# Patient Record
Sex: MALE | Race: WHITE | NOT HISPANIC OR LATINO | Employment: FULL TIME | URBAN - METROPOLITAN AREA
[De-identification: names, ages, dates, MRNs, and addresses within clinical notes are randomized per-mention and may not be internally consistent; named-entity substitution may affect disease eponyms.]

---

## 2020-03-03 ENCOUNTER — OFFICE VISIT - HEALTHEAST (OUTPATIENT)
Dept: FAMILY MEDICINE | Facility: CLINIC | Age: 25
End: 2020-03-03

## 2020-03-03 DIAGNOSIS — F10.10 ALCOHOL CONSUMPTION BINGE DRINKING: ICD-10-CM

## 2020-03-03 DIAGNOSIS — Z00.00 WELL ADULT EXAM: ICD-10-CM

## 2020-03-03 DIAGNOSIS — E66.3 OVERWEIGHT (BMI 25.0-29.9): ICD-10-CM

## 2020-03-03 DIAGNOSIS — Z13.220 SCREENING FOR HYPERLIPIDEMIA: ICD-10-CM

## 2020-03-03 DIAGNOSIS — Z13.1 SCREENING FOR DIABETES MELLITUS: ICD-10-CM

## 2020-03-03 LAB
CHOLEST SERPL-MCNC: 193 MG/DL
FASTING STATUS PATIENT QL REPORTED: YES
FASTING STATUS PATIENT QL REPORTED: YES
GLUCOSE BLD-MCNC: 97 MG/DL (ref 70–99)
HDLC SERPL-MCNC: 56 MG/DL
LDLC SERPL CALC-MCNC: 118 MG/DL
TRIGL SERPL-MCNC: 97 MG/DL

## 2020-03-03 ASSESSMENT — MIFFLIN-ST. JEOR: SCORE: 1910

## 2020-03-04 ENCOUNTER — COMMUNICATION - HEALTHEAST (OUTPATIENT)
Dept: FAMILY MEDICINE | Facility: CLINIC | Age: 25
End: 2020-03-04

## 2021-06-04 VITALS
DIASTOLIC BLOOD PRESSURE: 72 MMHG | SYSTOLIC BLOOD PRESSURE: 108 MMHG | HEART RATE: 48 BPM | RESPIRATION RATE: 17 BRPM | WEIGHT: 205.4 LBS | BODY MASS INDEX: 29.41 KG/M2 | HEIGHT: 70 IN

## 2021-06-06 NOTE — PROGRESS NOTES
Assessment/ Plan:      1. Well adult exam     2. Alcohol consumption binge drinking     3. Screening for hyperlipidemia  Lipid [place] FASTING   4. Screening for diabetes mellitus  Glucose   5. Overweight (BMI 25.0-29.9)       Healthy Male exam completed today.  I discussed preventative measures with the patient including continuing with lifestyle modifications of a balanced diet and regular cardiovascular exercise.  I discussed self testicular exams and how to complete these. Encouraged reduction of ETOH use and limiting use in general. Reviewed risks of ETOH use and impact on overall health.  I encouraged patient to follow-up yearly for annual physicals and sooner as needed.      Subjective:      Joe De Los Santos is a 24 y.o. male who presents for an annual exam. The patient reports that there is not domestic violence in his life. Strong family history of cardiac of CHF, hyperlipidemia, and hypertension. Dad is currently working with cardiology. He denies any other questions or concerns.      Healthy Habits:   Regular Exercise: Yes  Sunscreen Use: Yes  Healthy Diet: No  Dental Visits Regularly: Yes  Seat Belt: Yes  Sexually active: Yes  Monthly Self Testicular Exams:  No  Hemoccults: No  Flex Sig: No  Colonoscopy: No  Lipid Profile: Yes  Glucose Screen: Yes  Prevention of Osteoporosis: N/A  Last Dexa: N/A  Guns at Home:  Yes  Guns Safety Locks:  Yes      Immunization History   Administered Date(s) Administered     Dtap 1995, 1995, 1995, 10/25/1996, 07/20/2000     HPV Quadrivalent 06/20/2012, 10/05/2012, 09/03/2014     Hep A, Adult IM (19yr & older) 12/07/2017     Hep B, Peds or Adolescent 07/20/2000, 11/08/2000, 04/12/2001     Hib (PRP-T) 1995, 1995, 1995, 10/25/1996     IPV 1995, 10/25/1996, 07/20/2000     MMR 07/19/1996, 07/23/2007     Meningococcal MCV4P 06/11/2010, 09/03/2014     Tdap 12/07/2017     Varicella 11/07/2007, 09/24/2008     Immunization status: up to date and  documented.    No exam data present    No current outpatient medications on file.     No current facility-administered medications for this visit.      History reviewed. No pertinent past medical history.  History reviewed. No pertinent surgical history.  Patient has no known allergies.  Family History   Problem Relation Age of Onset     No Medical Problems Mother      Heart disease Father      Hyperlipidemia Father      Hypertension Father      Cancer Paternal Grandmother         ovarian cancer     Heart disease Paternal Grandfather      Social History     Socioeconomic History     Marital status: Single     Spouse name: Not on file     Number of children: Not on file     Years of education: Not on file     Highest education level: Not on file   Occupational History     Not on file   Social Needs     Financial resource strain: Not on file     Food insecurity:     Worry: Not on file     Inability: Not on file     Transportation needs:     Medical: Not on file     Non-medical: Not on file   Tobacco Use     Smoking status: Light Tobacco Smoker     Types: Cigarettes     Smokeless tobacco: Current User     Tobacco comment: smoking rare with drinking on a weekend   Substance and Sexual Activity     Alcohol use: Yes     Alcohol/week: 30.0 standard drinks     Types: 24 Cans of beer, 6 Shots of liquor per week     Frequency: 4 or more times a week     Drinks per session: 1 or 2     Binge frequency: Weekly     Drug use: Not Currently     Sexual activity: Yes     Partners: Female     Birth control/protection: Condom   Lifestyle     Physical activity:     Days per week: Not on file     Minutes per session: Not on file     Stress: Not on file   Relationships     Social connections:     Talks on phone: Not on file     Gets together: Not on file     Attends Christian service: Not on file     Active member of club or organization: Not on file     Attends meetings of clubs or organizations: Not on file     Relationship status: Not  "on file     Intimate partner violence:     Fear of current or ex partner: Not on file     Emotionally abused: Not on file     Physically abused: Not on file     Forced sexual activity: Not on file   Other Topics Concern     Not on file   Social History Narrative     Not on file       Review of Systems  General:  Denies problem  Eyes: Denies problem  Ears/Nose/Throat: Denies problem  Cardiovascular: Denies problem  Respiratory:  Denies problem  Gastrointestinal:  Denies problem  Genitourinary: Denies problem  Musculoskeletal:  Denies problem except for left shoulder pain. Following with orthopedics for this.   Skin: Denies problem  Neurologic: Denies problem  Psychiatric: Denies problem  Endocrine: Denies problem  Heme/Lymphatic: Denies problem   Allergic/Immunologic: Denies problem        Objective:     Vitals:    03/03/20 1103   BP: 108/72   Pulse: (!) 48   Resp: 17   Weight: 205 lb 6.4 oz (93.2 kg)   Height: 5' 9.5\" (1.765 m)     Body mass index is 29.9 kg/m .    Physical  General Appearance: Alert, cooperative, no distress, appears stated age  Head: Normocephalic, without obvious abnormality, atraumatic  Eyes: PERRL, conjunctiva/corneas clear, EOM's intact  Ears: Normal TM's and external ear canals, both ears  Nose: Nares normal, septum midline,mucosa normal, no drainage  Throat: Lips, mucosa, and tongue normal; teeth and gums normal  Neck: Supple, symmetrical, trachea midline, no adenopathy;  thyroid: not enlarged, symmetric, no tenderness/mass/nodules; no carotid bruit or JVD  Back: Symmetric, no curvature, ROM normal, no CVA tenderness  Lungs: Clear to auscultation bilaterally, respirations unlabored  Heart: Regular rate and rhythm, S1 and S2 normal, no murmur, rub, or gallop,  Abdomen: Soft, non-tender, bowel sounds active all four quadrants,  no masses, no organomegaly  Genitourinary: Penis normal. Right testis is descended. Left testis is descended.   Musculoskeletal: Normal range of motion. No joint " swelling or deformity.   Extremities: Extremities normal, atraumatic, no cyanosis or edema  Skin: Skin color, texture, turgor normal, no rashes or lesions  Lymph nodes: Cervical, supraclavicular, and axillary nodes normal  Neurologic: He is alert. He has normal reflexes.   Psychiatric: He has a normal mood and affect.

## 2021-06-16 PROBLEM — E66.3 OVERWEIGHT: Status: ACTIVE | Noted: 2020-03-03

## 2021-06-20 NOTE — LETTER
Letter by Maria Del Carmen Dunbar CNP at      Author: Maria Del Carmen Dunbar CNP Service: -- Author Type: --    Filed:  Encounter Date: 3/4/2020 Status: (Other)         Joe HANNAH De Los Santos  [address] [place] [address]             March 4, 2020         Dear Mr. De Los Santos,    Below are the results from your recent visit:    Resulted Orders   Lipid [place] FASTING   Result Value Ref Range    Cholesterol 193 <=199 mg/dL    Triglycerides 97 <=149 mg/dL    HDL Cholesterol 56 >=40 mg/dL    LDL Calculated 118 <=129 mg/dL    Patient Fasting > 8hrs? Yes    Glucose   Result Value Ref Range    Glucose 97 70 - 99 mg/dL    Patient Fasting > 8hrs? Yes     Narrative    Fasting Glucose reference range is 70-99 mg/dL per  American Diabetes Association (ADA) guidelines.       Your cholesterol is curly along with the rest of the results. As we discussed during your office visit goal is to remain healthy and implementing habits now that will remain with you. It was nice to meet you. Let me know if you have any questions.     Please call with questions or contact us using [employer].    Sincerely,        Electronically signed by Maria Del Carmen Dunbar CNP

## 2024-01-09 ENCOUNTER — OFFICE VISIT (OUTPATIENT)
Dept: FAMILY MEDICINE | Facility: CLINIC | Age: 29
End: 2024-01-09
Payer: COMMERCIAL

## 2024-01-09 VITALS
SYSTOLIC BLOOD PRESSURE: 106 MMHG | BODY MASS INDEX: 28.92 KG/M2 | HEIGHT: 70 IN | TEMPERATURE: 98.1 F | DIASTOLIC BLOOD PRESSURE: 73 MMHG | RESPIRATION RATE: 16 BRPM | HEART RATE: 62 BPM | WEIGHT: 202 LBS | OXYGEN SATURATION: 98 %

## 2024-01-09 DIAGNOSIS — Z11.59 NEED FOR HEPATITIS C SCREENING TEST: ICD-10-CM

## 2024-01-09 DIAGNOSIS — F10.29 ALCOHOL DEPENDENCE WITH UNSPECIFIED ALCOHOL-INDUCED DISORDER (H): ICD-10-CM

## 2024-01-09 DIAGNOSIS — Z23 NEED FOR VACCINATION: ICD-10-CM

## 2024-01-09 DIAGNOSIS — Z11.3 SCREEN FOR STD (SEXUALLY TRANSMITTED DISEASE): ICD-10-CM

## 2024-01-09 DIAGNOSIS — Z11.4 SCREENING FOR HIV (HUMAN IMMUNODEFICIENCY VIRUS): ICD-10-CM

## 2024-01-09 DIAGNOSIS — Z00.00 ENCOUNTER FOR ANNUAL PHYSICAL EXAM: Primary | ICD-10-CM

## 2024-01-09 DIAGNOSIS — Z82.49 FAMILY HISTORY OF HEART DISEASE: ICD-10-CM

## 2024-01-09 DIAGNOSIS — M54.9 MID BACK PAIN: ICD-10-CM

## 2024-01-09 DIAGNOSIS — R68.84 JAW PAIN: ICD-10-CM

## 2024-01-09 DIAGNOSIS — E66.3 OVERWEIGHT: ICD-10-CM

## 2024-01-09 PROBLEM — F10.20 ALCOHOL DEPENDENCE (H): Status: ACTIVE | Noted: 2024-01-09

## 2024-01-09 LAB
ALBUMIN SERPL BCG-MCNC: 4.8 G/DL (ref 3.5–5.2)
ALBUMIN UR-MCNC: ABNORMAL MG/DL
ALP SERPL-CCNC: 49 U/L (ref 40–150)
ALT SERPL W P-5'-P-CCNC: 38 U/L (ref 0–70)
ANION GAP SERPL CALCULATED.3IONS-SCNC: 10 MMOL/L (ref 7–15)
APPEARANCE UR: CLEAR
AST SERPL W P-5'-P-CCNC: 31 U/L (ref 0–45)
BACTERIA #/AREA URNS HPF: ABNORMAL /HPF
BASOPHILS # BLD AUTO: 0 10E3/UL (ref 0–0.2)
BASOPHILS NFR BLD AUTO: 0 %
BILIRUB DIRECT SERPL-MCNC: <0.2 MG/DL (ref 0–0.3)
BILIRUB SERPL-MCNC: 0.4 MG/DL
BILIRUB UR QL STRIP: NEGATIVE
BUN SERPL-MCNC: 11.2 MG/DL (ref 6–20)
CALCIUM SERPL-MCNC: 9.9 MG/DL (ref 8.6–10)
CHLORIDE SERPL-SCNC: 103 MMOL/L (ref 98–107)
CHOLEST SERPL-MCNC: 194 MG/DL
COLOR UR AUTO: YELLOW
CREAT SERPL-MCNC: 1.03 MG/DL (ref 0.67–1.17)
DEPRECATED HCO3 PLAS-SCNC: 26 MMOL/L (ref 22–29)
EGFRCR SERPLBLD CKD-EPI 2021: >90 ML/MIN/1.73M2
EOSINOPHIL # BLD AUTO: 0.1 10E3/UL (ref 0–0.7)
EOSINOPHIL NFR BLD AUTO: 3 %
ERYTHROCYTE [DISTWIDTH] IN BLOOD BY AUTOMATED COUNT: 11.9 % (ref 10–15)
FASTING STATUS PATIENT QL REPORTED: NO
GLUCOSE SERPL-MCNC: 96 MG/DL (ref 70–99)
GLUCOSE UR STRIP-MCNC: NEGATIVE MG/DL
HCT VFR BLD AUTO: 44.1 % (ref 40–53)
HCV AB SERPL QL IA: NONREACTIVE
HDLC SERPL-MCNC: 59 MG/DL
HGB BLD-MCNC: 15.1 G/DL (ref 13.3–17.7)
HGB UR QL STRIP: ABNORMAL
HIV 1+2 AB+HIV1 P24 AG SERPL QL IA: NONREACTIVE
IMM GRANULOCYTES # BLD: 0 10E3/UL
IMM GRANULOCYTES NFR BLD: 0 %
KETONES UR STRIP-MCNC: NEGATIVE MG/DL
LDLC SERPL CALC-MCNC: 118 MG/DL
LEUKOCYTE ESTERASE UR QL STRIP: NEGATIVE
LYMPHOCYTES # BLD AUTO: 1.3 10E3/UL (ref 0.8–5.3)
LYMPHOCYTES NFR BLD AUTO: 27 %
MCH RBC QN AUTO: 32.3 PG (ref 26.5–33)
MCHC RBC AUTO-ENTMCNC: 34.2 G/DL (ref 31.5–36.5)
MCV RBC AUTO: 94 FL (ref 78–100)
MONOCYTES # BLD AUTO: 0.4 10E3/UL (ref 0–1.3)
MONOCYTES NFR BLD AUTO: 8 %
MUCOUS THREADS #/AREA URNS LPF: PRESENT /LPF
NEUTROPHILS # BLD AUTO: 3.1 10E3/UL (ref 1.6–8.3)
NEUTROPHILS NFR BLD AUTO: 62 %
NITRATE UR QL: NEGATIVE
NONHDLC SERPL-MCNC: 135 MG/DL
PH UR STRIP: 6.5 [PH] (ref 5–7)
PLATELET # BLD AUTO: 180 10E3/UL (ref 150–450)
POTASSIUM SERPL-SCNC: 4.6 MMOL/L (ref 3.4–5.3)
PROT SERPL-MCNC: 7.3 G/DL (ref 6.4–8.3)
RBC # BLD AUTO: 4.68 10E6/UL (ref 4.4–5.9)
RBC #/AREA URNS AUTO: ABNORMAL /HPF
SODIUM SERPL-SCNC: 139 MMOL/L (ref 135–145)
SP GR UR STRIP: 1.02 (ref 1–1.03)
SQUAMOUS #/AREA URNS AUTO: ABNORMAL /LPF
TRIGL SERPL-MCNC: 84 MG/DL
TSH SERPL DL<=0.005 MIU/L-ACNC: 0.58 UIU/ML (ref 0.3–4.2)
UROBILINOGEN UR STRIP-ACNC: 0.2 E.U./DL
WBC # BLD AUTO: 4.9 10E3/UL (ref 4–11)
WBC #/AREA URNS AUTO: ABNORMAL /HPF

## 2024-01-09 PROCEDURE — 90471 IMMUNIZATION ADMIN: CPT | Performed by: FAMILY MEDICINE

## 2024-01-09 PROCEDURE — 85025 COMPLETE CBC W/AUTO DIFF WBC: CPT | Performed by: FAMILY MEDICINE

## 2024-01-09 PROCEDURE — 84443 ASSAY THYROID STIM HORMONE: CPT | Performed by: FAMILY MEDICINE

## 2024-01-09 PROCEDURE — 36415 COLL VENOUS BLD VENIPUNCTURE: CPT | Performed by: FAMILY MEDICINE

## 2024-01-09 PROCEDURE — 86803 HEPATITIS C AB TEST: CPT | Performed by: FAMILY MEDICINE

## 2024-01-09 PROCEDURE — 80053 COMPREHEN METABOLIC PANEL: CPT | Performed by: FAMILY MEDICINE

## 2024-01-09 PROCEDURE — 87591 N.GONORRHOEAE DNA AMP PROB: CPT | Performed by: FAMILY MEDICINE

## 2024-01-09 PROCEDURE — 99213 OFFICE O/P EST LOW 20 MIN: CPT | Mod: 25 | Performed by: FAMILY MEDICINE

## 2024-01-09 PROCEDURE — 87389 HIV-1 AG W/HIV-1&-2 AB AG IA: CPT | Performed by: FAMILY MEDICINE

## 2024-01-09 PROCEDURE — 87491 CHLMYD TRACH DNA AMP PROBE: CPT | Performed by: FAMILY MEDICINE

## 2024-01-09 PROCEDURE — 90746 HEPB VACCINE 3 DOSE ADULT IM: CPT | Performed by: FAMILY MEDICINE

## 2024-01-09 PROCEDURE — 80061 LIPID PANEL: CPT | Performed by: FAMILY MEDICINE

## 2024-01-09 PROCEDURE — 99385 PREV VISIT NEW AGE 18-39: CPT | Mod: 25 | Performed by: FAMILY MEDICINE

## 2024-01-09 PROCEDURE — 82248 BILIRUBIN DIRECT: CPT | Performed by: FAMILY MEDICINE

## 2024-01-09 PROCEDURE — 81001 URINALYSIS AUTO W/SCOPE: CPT | Performed by: FAMILY MEDICINE

## 2024-01-09 ASSESSMENT — ENCOUNTER SYMPTOMS
MYALGIAS: 0
DIARRHEA: 0
EYE PAIN: 0
DIZZINESS: 0
ABDOMINAL PAIN: 0
HEARTBURN: 0
COUGH: 0
HEADACHES: 0
JOINT SWELLING: 0
HEMATURIA: 0
DYSURIA: 0
SHORTNESS OF BREATH: 0
FEVER: 0
CHILLS: 0
ARTHRALGIAS: 0
NAUSEA: 0
PALPITATIONS: 0
WEAKNESS: 0
PARESTHESIAS: 0
SORE THROAT: 0
CONSTIPATION: 0
HEMATOCHEZIA: 1
NERVOUS/ANXIOUS: 0
FREQUENCY: 0

## 2024-01-09 NOTE — PATIENT INSTRUCTIONS
-Thank you for choosing the [facility].  -It was a pleasure to see you today.  -Please take a look at the information below for more specific details regarding the treatment plan and recommendations.  -In this after visit summary is a list of your medications and specific instructions.  Please review this carefully as there may be changes made to your medication list.  -If there are any particular questions or concerns, please feel free to reach out to Dr. Rao.  -If any labs have been completed, we will reach out to you about results.  If the results are normal or not concerning, a letter or [employer] message will be sent to you.  If any follow-up is needed, either Dr. Rao or the nurse will give you a call.  If you have not heard regarding results after 2 weeks, please reach out to the clinic.    Patient Instructions:    -Dr. Rao recommends that you reduce/avoid alcohol intake as this will help your overall health.  -Recommend recommend you quit vaping.  -Do the back in jaw exercises as included in this packet.  -Take ibuprofen/Advil 600 mg once every 6 hours as needed for pain.  Daily long-term use for ibuprofen is not recommended due to potential side effects.  -Take acetaminophen/Tylenol 1000 mg once every 8 hours as needed for pain.  -Rest and limit usage of the affected area.  -Try to remain active and limit your activity based on discomfort.  -Apply a cold pack to the affected area for a maximum of 20 minutes at a time, once an hour as needed for pain and swelling.  Application of the cold pack for more than 20 minutes can increase risk of injuries to surrounding tissue.  -Apply a warm pack to the affected area as needed for discomforts.  The warm pack will help to improve blood flow and relax surrounding tissues.  You may make your own warm pack by doing the following: Take a sock, fill the sock with uncooked rice, and tie it off at the opened end.  You may place the sock and rice in  the microwave for 30-60 seconds at a time or until warm.  Be cautious that the sock/rice is not too hot.  -Do stretches to help relax the surrounding muscles.  When doing stretches, be sure to hold your body in that position (avoid moving) and hold the stretch for 30 seconds.     -You are doing well overall.  -Continue to eat well.  Try to increase your servings of calcium as this can help your bones stay strong and healthy.  Follow a nutrition plan patient fruits and vegetables and low in fats and cholesterol.    -Be sure to eat 5-7 servings of fruits and vegetables each day.  -Find ways to stay active.  Try to get 150 minutes of moderate activity (where you are breathing faster and slightly sweating) each week.  -Try to maintain a body mass index (BMI) of 18.5-25 as this is considered a healthier weight range.  -Brush your teeth twice daily.  See a dentist every 6-12 months.  -Be sure to use sunblock with SPF 15 or greater when going outside for extended periods of time.  Sunblock should be used even when it is a cloudy day.  Do intermittent skin checks for any concerning skin changes.  Wearing a wide brimmed hat and sunglasses can also be helpful to protect your skin from the sun.  -Monitor for any abnormal skin changes (such as new moles/spots, painful moles, changes in your old moles, wounds that will not heal, multiple colors noted in one lesion, lesions that are asymmetric or not circular, or anything that is concerning for you). If any of these are noted, please schedule an appointment to be seen.     -It is generally recommended for you to complete a health care directive or living will. These documents will be able to reflect your wishes and desire in the case that you are unable to express them yourself. Please let Dr. Rao know if you would like some assistance with this process.    -For the preventive health procedure (such as colonoscopy, mammogram, etc) order from today, if you do not hear within a  week's time from the specialist to schedule an appointment, please call the [facility] and speak with the specialty  to help you schedule the appointment. Depending on the specialist availability, it may be a number of weeks prior to your scheduled appointment.    Please seek immediate medical attention (go to the emergency room or urgent care) for the following reasons: worsening symptoms, urine/stool incontinence, fevers, chills, severe headaches, or any concerning changes.    Return to clinic in 1 month if symptoms or not improving.      --------------------------------------------------------------------------------------------------------------------    -We are always looking for ways to improve.  You may be selected to receive a survey regarding your visit today.  We encourage you to complete the survey and provide specific, constructive feedback to help us improve our processes.  Thank you for your time!  -Please review the contact information listed on the after visit summary and in the electronic chart.  Below is the phone number that we have on file.  If there are any changes that are needed to be made, please reach out to the clinic.  [phone] (home)

## 2024-01-09 NOTE — PROGRESS NOTES
SUBJECTIVE:   Joe is a 28 year old, presenting for the following:  Physical and Back Pain (Concerns about kidney. Having back pain for a month)    Concerns about kidney/back pains for months: Patient reports that the symptoms started about a month ago.  The pain is in the mid back on both sides. Pain is sporadic and waxes and wanes. The pain is not sharp. It is lingering. He plays a lot of sports. Periodically gets a sharp pain in the LUQ. Denies any falls, injuries, blood in urine, Coca-Cola colored urine, fevers, chills, nausea, vomiting, burning with urination, penile discharge, urine/stool incontinence, headaches, or other symptom changes from baseline.     Patient does have a history of alcohol dependence.  The last time patient used alcohol was yesterday. He drinks 3-4 times a week. He feels great overall. Drinks 3-4 beers on a regular night, heavy nights are 5-8 beers a night.  Discussed recommendations to avoid alcohol usage and potential long-term effects.    His dad, MGF, MGM all have a history of CHF.  No kidney disease in the family.      Right jaw pain: has been painful. The pain dissipates during the day. When he wakes up in the morning, it hurts. When he opens his jaw, it hurts. Never locks up.  Because he is talking, moving and doing things during the day, symptoms seem to improve. He got a new job three months ago and he has been really stressed. He has been grinding his teeth, per the dentist.         1/9/2024    10:52 AM   Additional Questions   Roomed by Dimitris GARAY       Healthy Habits:     Getting at least 3 servings of Calcium per day:  NO    Bi-annual eye exam:  NO    Dental care twice a year:  Yes    Sleep apnea or symptoms of sleep apnea:  None    Diet:  Regular (no restrictions)    Frequency of exercise:  2-3 days/week    Duration of exercise:  15-30 minutes    Taking medications regularly:  Yes    Medication side effects:  None    Additional concerns today:  No      Today's PHQ-2 Score:        1/9/2024    10:43 AM   PHQ-2 ( 1999 Pfizer)   Q1: Little interest or pleasure in doing things 0   Q2: Feeling down, depressed or hopeless 0   PHQ-2 Score 0   Q1: Little interest or pleasure in doing things Not at all   Q2: Feeling down, depressed or hopeless Not at all   PHQ-2 Score 0       -Reviewed healthy eating and exercise.  -Skin cancer screening: No concerning skin changes expressed by patient.  -Smoking status:   Tobacco Use      Smoking status: Light Smoker        Types: Cigarettes      Smokeless tobacco: Current      Tobacco comments: smoking rare with drinking on a weekend  .  Reviewed smoking cessation recommendations.  Discussed quitting vaping.     -Family history:   Family History   Problem Relation Age of Onset    No Known Problems Mother     Heart Disease Father     Hyperlipidemia Father     Hypertension Father     Heart Failure Father     Heart Failure Maternal Grandmother     Heart Failure Maternal Grandfather     Cancer Paternal Grandmother         ovarian cancer    Heart Disease Paternal Grandfather     Kidney Disease No family hx of        Preventative health recommendations, evaluation options, and risk/benefits of each were discussed with patient. Accepted recommendations were ordered. Otherwise, patient declined.  Health Maintenance Due   Topic Date Due    ANNUAL REVIEW OF HM ORDERS  Never done    ADVANCE CARE PLANNING  Never done    Pneumococcal Vaccine: Pediatrics (0 to 5 Years) and At-Risk Patients (6 to 64 Years) (1 of 2 - PCV) Never done    YEARLY PREVENTIVE VISIT  03/03/2021    INFLUENZA VACCINE (1) 09/01/2023    COVID-19 Vaccine (2 - 2023-24 season) 09/01/2023     Patient noted to have a negative hepatitis B surface antibody test from 10/04/2021. Reviewed recommendations for hep B vaccination.    -Immunizations due were reviewed.    Immunization History   Administered Date(s) Administered    COVID-19 Monovalent 12+ (Pfizer 2022) 01/26/2022    DTAP (<7y) 1995, 1995,  1995, 10/25/1996, 07/20/2000    HIB (PRP-T) 1995, 1995, 1995, 10/25/1996    HPV Quadrivalent 06/20/2012, 10/05/2012, 09/03/2014    Hepatitis A (ADULT 19+) 12/07/2017    Hepatitis B, Adult 01/09/2024    Hepatitis B, Peds 07/20/2000, 11/08/2000, 04/12/2001    Influenza Vaccine >6 months,quad, PF 09/23/2015, 12/07/2017, 09/21/2021    MMR 07/19/1996, 07/23/2007    Meningococcal ACWY (Menactra ) 06/11/2010, 09/03/2014    Poliovirus, inactivated (IPV) 1995, 10/25/1996, 07/20/2000    TDAP (Adacel,Boostrix) 12/07/2017    Varicella 11/07/2007, 09/24/2008       -Labs: Laboratory recommendations reviewed with patient.  History of positive chlamydia test in 09/21/2021.  Repeat testing on 10/4/2021 demonstrated cure.    Have you ever done Advance Care Planning? (For example, a Health Directive, POLST, or a discussion with a medical provider or your loved ones about your wishes): See AVS.    Social History     Tobacco Use    Smoking status: Light Smoker     Types: Cigarettes    Smokeless tobacco: Current    Tobacco comments:     smoking rare with drinking on a weekend   Substance Use Topics    Alcohol use: Yes     Alcohol/week: 30.0 standard drinks of alcohol             1/9/2024    10:42 AM   Alcohol Use   Prescreen: >3 drinks/day or >7 drinks/week? Yes   AUDIT SCORE  8         1/9/2024    10:42 AM   AUDIT - Alcohol Use Disorders Identification Test - Reproduced from the World Health Organization Audit 2001 (Second Edition)   1.  How often do you have a drink containing alcohol? 2 to 3 times a week   2.  How many drinks containing alcohol do you have on a typical day when you are drinking? 5 or 6   3.  How often do you have five or more drinks on one occasion? Weekly   4.  How often during the last year have you found that you were not able to stop drinking once you had started? Never   5.  How often during the last year have you failed to do what was normally expected of you because of drinking?  "Never   6.  How often during the last year have you needed a first drink in the morning to get yourself going after a heavy drinking session? Never   7.  How often during the last year have you had a feeling of guilt or remorse after drinking? Never   8.  How often during the last year have you been unable to remember what happened the night before because of your drinking? Never   9.  Have you or someone else been injured because of your drinking? No   10. Has a relative, friend, doctor or other health care worker been concerned about your drinking or suggested you cut down? No   TOTAL SCORE 8       Last PSA: No results found for: \"PSA\"    Reviewed orders with patient. Reviewed health maintenance and updated orders accordingly - Yes      Reviewed and updated as needed this visit by clinical staff   Tobacco  Allergies  Meds    Surg Hx  Fam Hx          Reviewed and updated as needed this visit by Provider        Surg Hx  Fam Hx         No past medical history on file.   Past Surgical History:   Procedure Laterality Date    TONSILLECTOMY  2015     Tonsillectomy in 2015.     Review of Systems   Constitutional:  Negative for chills and fever.   HENT:  Negative for congestion, ear pain, hearing loss and sore throat.    Eyes:  Negative for pain and visual disturbance.   Respiratory:  Negative for cough and shortness of breath.    Cardiovascular:  Negative for chest pain, palpitations and peripheral edema.   Gastrointestinal:  Positive for hematochezia. Negative for abdominal pain, constipation, diarrhea, heartburn and nausea.   Genitourinary:  Negative for dysuria, frequency, genital sores, hematuria, impotence, penile discharge and urgency.   Musculoskeletal:  Negative for arthralgias, joint swelling and myalgias.   Skin:  Negative for rash.   Neurological:  Negative for dizziness, weakness, headaches and paresthesias.   Psychiatric/Behavioral:  Negative for mood changes. The patient is not nervous/anxious.  " "      OBJECTIVE:   /73   Pulse 62   Temp 98.1  F (36.7  C) (Oral)   Resp 16   Ht 1.768 m (5' 9.6\")   Wt 91.6 kg (202 lb)   SpO2 98%   BMI 29.32 kg/m      Physical Exam  GENERAL: healthy, alert and no distress  EYES: Eyes grossly normal to inspection, PERRL and conjunctivae and sclerae normal  HENT: ear canals and TM's normal, nose and mouth without ulcers or lesions.  Jaw exam: TMJ is nontender to palpation, though some hypermobility noted at the right TMJ.  Movement is normal in the left.  No locking appreciated.  NECK: no adenopathy, no asymmetry, masses, or scars and thyroid normal to palpation  RESP: lungs clear to auscultation - no rales, rhonchi or wheezes  CV: regular rate and rhythm, normal S1 S2, no S3 or S4, no murmur, click or rub, no peripheral edema and peripheral pulses strong  Back: Patient does indicate some discomfort to palpation along the paraspinal muscles (bilateral) in the mid back region.  Otherwise, back is within normal limits.  ABDOMEN: soft, nontender, no hepatosplenomegaly, no masses and bowel sounds normal  MS: no gross musculoskeletal defects noted, no edema  SKIN: no suspicious lesions or rashes  NEURO: Normal strength and tone, mentation intact and speech normal  PSYCH: mentation appears normal, affect normal/bright    Diagnostic Test Results:  Labs reviewed in Epic    ASSESSMENT/PLAN:       Patient Instructions:    -Dr. Rao recommends that you reduce/avoid alcohol intake as this will help your overall health.  -Recommend recommend you quit vaping.  -Do the back in jaw exercises as included in this packet.  -Take ibuprofen/Advil 600 mg once every 6 hours as needed for pain.  Daily long-term use for ibuprofen is not recommended due to potential side effects.  -Take acetaminophen/Tylenol 1000 mg once every 8 hours as needed for pain.  -Rest and limit usage of the affected area.  -Try to remain active and limit your activity based on discomfort.  -Apply a cold pack to the " affected area for a maximum of 20 minutes at a time, once an hour as needed for pain and swelling.  Application of the cold pack for more than 20 minutes can increase risk of injuries to surrounding tissue.  -Apply a warm pack to the affected area as needed for discomforts.  The warm pack will help to improve blood flow and relax surrounding tissues.  You may make your own warm pack by doing the following: Take a sock, fill the sock with uncooked rice, and tie it off at the opened end.  You may place the sock and rice in the microwave for 30-60 seconds at a time or until warm.  Be cautious that the sock/rice is not too hot.  -Do stretches to help relax the surrounding muscles.  When doing stretches, be sure to hold your body in that position (avoid moving) and hold the stretch for 30 seconds.     -You are doing well overall.  -Continue to eat well.  Try to increase your servings of calcium as this can help your bones stay strong and healthy.  Follow a nutrition plan patient fruits and vegetables and low in fats and cholesterol.    -Be sure to eat 5-7 servings of fruits and vegetables each day.  -Find ways to stay active.  Try to get 150 minutes of moderate activity (where you are breathing faster and slightly sweating) each week.  -Try to maintain a body mass index (BMI) of 18.5-25 as this is considered a healthier weight range.  -Brush your teeth twice daily.  See a dentist every 6-12 months.  -Be sure to use sunblock with SPF 15 or greater when going outside for extended periods of time.  Sunblock should be used even when it is a cloudy day.  Do intermittent skin checks for any concerning skin changes.  Wearing a wide brimmed hat and sunglasses can also be helpful to protect your skin from the sun.  -Monitor for any abnormal skin changes (such as new moles/spots, painful moles, changes in your old moles, wounds that will not heal, multiple colors noted in one lesion, lesions that are asymmetric or not circular, or  anything that is concerning for you). If any of these are noted, please schedule an appointment to be seen.     -It is generally recommended for you to complete a health care directive or living will. These documents will be able to reflect your wishes and desire in the case that you are unable to express them yourself. Please let Dr. Rao know if you would like some assistance with this process.    -For the preventive health procedure (such as colonoscopy, mammogram, etc) order from today, if you do not hear within a week's time from the specialist to schedule an appointment, please call the [facility] and speak with the specialty  to help you schedule the appointment. Depending on the specialist availability, it may be a number of weeks prior to your scheduled appointment.    Please seek immediate medical attention (go to the emergency room or urgent care) for the following reasons: worsening symptoms, urine/stool incontinence, fevers, chills, severe headaches, or any concerning changes.    Return to clinic in 1 month if symptoms or not improving.      Joe was seen today for physical and back pain.  Diagnoses and all orders for this visit:    Encounter for annual physical exam  Alcohol dependence with unspecified alcohol-induced disorder (H)  Overweight (BMI 25.0-29.9)  Family history of heart disease  Mid back pain (Bilateral paraspinal muscles from T10-L2.  No midline tenderness.):  Plan to check labs as below.  Further recommendations pending results.  Patient was recommended to stop and/or reduce alcohol usage.  Back exercises given to patient.  Orders:  -     Basic metabolic panel; Future  -     CBC with Platelets & Differential; Future  -     Hepatic function panel; Future  -     Lipid panel reflex to direct LDL Non-fasting; Future  -     TSH with free T4 reflex; Future  -     UA Macroscopic with reflex to Microscopic and Culture; Future    Jaw pain: Hypermobility noted on the right TMJ.   "Possibly related to increased stressors and subsequent grinding of the teeth at night.  Recommended a mouthguard and home exercises given.  Further recommendations pending response to treatment.    Screening for HIV (human immunodeficiency virus)  Need for hepatitis C screening test  Screen for STD (sexually transmitted disease)  -     HIV Antigen Antibody Combo; Future  -     Hepatitis C Screen Reflex to HCV RNA Quant and Genotype; Future  -     NEISSERIA GONORRHOEA PCR; Future  -     CHLAMYDIA TRACHOMATIS PCR; Future    Need for vaccination: Patient declined all other vaccinations.  -     HEPATITIS B, ADULT 20+ (ENGERIX-B/RECOMBIVAX HB)          Patient has been advised of split billing requirements and indicates understanding: Yes (by nurse)      COUNSELING:   Reviewed preventive health recommendations.      BMI:   Estimated body mass index is 29.32 kg/m  as calculated from the following:    Height as of this encounter: 1.768 m (5' 9.6\").    Weight as of this encounter: 91.6 kg (202 lb).       He reports that he has been smoking cigarettes. He uses smokeless tobacco.  Nicotine/Tobacco Cessation Plan:   Reviewed smoking cessation recommendations.        Cesar Rao MD  [facility] [place] [address]  Phone: [phone]  Fax: [phone]    1/11/2024  1:20 PM  "

## 2024-01-10 LAB
C TRACH DNA SPEC QL NAA+PROBE: NEGATIVE
N GONORRHOEA DNA SPEC QL NAA+PROBE: NEGATIVE

## 2024-01-11 ENCOUNTER — TELEPHONE (OUTPATIENT)
Dept: FAMILY MEDICINE | Facility: CLINIC | Age: 29
End: 2024-01-11
Payer: COMMERCIAL

## 2024-01-11 NOTE — TELEPHONE ENCOUNTER
Test Results    Contacts         Type Contact Phone/Fax    01/11/2024 01:04 PM CST Phone (Incoming) Joe De Los Santos (Self) [phone] (H)            Who ordered the test:      Type of test: Lab    Date of test:  01/09/2024    Where was the test performed:  [facility]    What are your questions/concerns?:  need someone to explain results    Could we [facility] this information to you [facility] or would you prefer to receive a phone call?:   Patient would prefer a phone call   Okay to leave a detailed message?: Yes at Cell number on file:    Telephone Information:   Mobile [phone]

## 2024-01-16 NOTE — TELEPHONE ENCOUNTER
Result message sent as below.     VIDA Godinez, was able to get a hold of patient today (01/15/2024) and relay results. Closing this encounter.     Cesar Rao MD  [facility] [place] [address]  Phone: [phone]  Fax: [phone]    1/15/2024  6:22 PM         Herbert Simeon MA  1/15/2024 10:46 AM CST Back to Top      The patient verbalizes understanding of provider/CSS instructions for follow-up and continued care per provider message.    Cesar Rao MD  1/10/2024  1:55 PM CST       Team - please call patient with results.     Arelis De Los Santos,     I hope you have been well since our last visit. Below are the results from the testing completed at the visit.     The following results were normal or not concerning: Urine test, kidney function, electrolytes, liver function, thyroid function, blood counts.     You do not have gonorrhea, chlamydia, HIV, hepatitis C.     The total cholesterol was normal and LDL or bad cholesterol was slightly elevated.  The triglycerides, which is primarily affected by food, is normal. The HDL or good cholesterol are in the normal range.  No medications are recommended at this time, though you should try to follow a diet that is rich in fruits and vegetables and low in fats and cholesterol.  In addition, find ways to stay active.  Doing aerobic activities (such as running, biking, etc.) will help improve the HDL or good cholesterol.  Weight loss is also recommended.     Dr. Rao recommends that you continue on the plan as discussed in clinic.     If there are any questions or concerns, please call the clinic or schedule an appointment for follow up.     Best wishes,              Cesar Rao MD  [facility]  1/10/2024  1:53 PM

## 2025-02-16 ENCOUNTER — HEALTH MAINTENANCE LETTER (OUTPATIENT)
Age: 30
End: 2025-02-16